# Patient Record
(demographics unavailable — no encounter records)

---

## 2024-11-18 NOTE — PHYSICAL EXAM
[FreeTextEntry3] : Vascular Exam: DP Pulse (left): 1/4. DP Pulse (right): 1/4. PT Pulse (left): 1/4. PT Pulse (right): 1/4. Capillary Return - L: Instantaneous. Capillary Return - R: Instantaneous. Temperature Grad - L: Hot to Warm. Temperature Grad - R: Hot to Warm. [de-identified] : Orthopedic Exam: No structural deformities present. [FreeTextEntry1] : Neurological Exam: Sharp / Dull - L: WNL. Sharp / Dull - R: WNL. Light Touch/pressure - L: WNL. Light Touch/pressure - R: WNL. Hot/cold - L: WNL. Hot/cold - R: WNL. Vibratory - L: WNL. Vibratory - R: WNL.

## 2024-11-18 NOTE — HISTORY OF PRESENT ILLNESS
[FreeTextEntry1] : Kj is a 61-year-old male who presents to our office this morning for continued care of itchy feet.  He used the cream we prescribed for a few weeks.  It went away but now seems to be returning.

## 2024-11-18 NOTE — PROCEDURE
[FreeTextEntry1] : Plan:  Examination.  (Op=[id number]).  We had a lengthy discussion concerning the patient's condition.  Lotion was applied and we suggested he apply OTC moisturizer daily. Patient to return: as needed.

## 2025-01-27 NOTE — HISTORY OF PRESENT ILLNESS
[FreeTextEntry1] : Kj is a 61-year-old male who presents complaining of itchy feet.  His skin is quite dry and doesn't seem to get better no matter what he uses.

## 2025-01-27 NOTE — PROCEDURE
[FreeTextEntry1] : Plan:  Examination.  (He=[id number]).  We had a lengthy discussion concerning the patient's condition.  Surgical debridement of the affected skin area.  Lotion was applied and we demonstrated how to properly apply OTC moisturizer.  He is to do it daily.  Patient to return: 2 months.

## 2025-01-27 NOTE — PHYSICAL EXAM
[FreeTextEntry3] : Vascular Exam: DP Pulse (left): 1/4. DP Pulse (right): 1/4. PT Pulse (left): 1/4. PT Pulse (right): 1/4. Capillary Return - L: Instantaneous. Capillary Return - R: Instantaneous. Temperature Grad - L: Hot to Warm. Temperature Grad - R: Hot to Warm. [de-identified] : Orthopedic Exam: No structural deformities present. [FreeTextEntry1] : Neurological Exam: Sharp / Dull - L: WNL. Sharp / Dull - R: WNL. Light Touch/pressure - L: WNL. Light Touch/pressure - R: WNL. Hot/cold - L: WNL. Hot/cold - R: WNL. Vibratory - L: WNL. Vibratory - R: WNL.

## 2025-04-11 NOTE — HISTORY OF PRESENT ILLNESS
[FreeTextEntry1] : Kj is a 61-year-old male who presents this morning for follow up care of dry, itchy feet.  He has been applying lotion when he remembers but his skin is still quite dry, and lotion doesn't seem to help.

## 2025-04-11 NOTE — PHYSICAL EXAM
[General Appearance - Alert] : alert [General Appearance - Well Nourished] : well nourished [General Appearance - Well-Appearing] : healthy appearing [FreeTextEntry3] : Vascular Exam: DP Pulse (left): 1/4. DP Pulse (right): 1/4. PT Pulse (left): 1/4. PT Pulse (right): 1/4. Capillary Return - L: Instantaneous. Capillary Return - R: Instantaneous. Temperature Grad - L: Hot to Warm. Temperature Grad - R: Hot to Warm. [de-identified] : Orthopedic Exam: No structural deformities present. [FreeTextEntry1] : Neurological Exam: Sharp / Dull - L: WNL. Sharp / Dull - R: WNL. Light Touch/pressure - L: WNL. Light Touch/pressure - R: WNL. Hot/cold - L: WNL. Hot/cold - R: WNL. Vibratory - L: WNL. Vibratory - R: WNL.

## 2025-04-11 NOTE — PROCEDURE
[FreeTextEntry1] : Plan:  Examination.  (Sb=[id number]).  We had a lengthy discussion concerning the patient's condition.  Surgical debridement of the affected skin area.  Lotion was applied and we demonstrated how to properly apply OTC moisturizer.  He promises to do it daily.  Patient to return: 2 months.

## 2025-06-23 NOTE — HISTORY OF PRESENT ILLNESS
[FreeTextEntry1] : Kj is a 62-year-old male who presents for continued care of dry on both feet.  He has been applying lotion when he remembers.  He had a piece of skin catch in the floor at home.

## 2025-06-23 NOTE — PROCEDURE
[FreeTextEntry1] : Plan:  Examination.  (Ss=[id number]).  We had a lengthy discussion concerning the patient's condition.  Surgical debridement of the affected skin area including the fissure site removing the sharp border.  Lotion was applied and we demonstrated how to properly apply OTC moisturizer.  He promises to do it daily.   Patient to return: 2 months.

## 2025-06-23 NOTE — PHYSICAL EXAM
[General Appearance - Alert] : alert [General Appearance - Well Nourished] : well nourished [General Appearance - Well-Appearing] : healthy appearing [FreeTextEntry3] : Vascular Exam: DP Pulse (left): 1/4. DP Pulse (right): 1/4. PT Pulse (left): 1/4. PT Pulse (right): 1/4. Capillary Return - L: Instantaneous. Capillary Return - R: Instantaneous. Temperature Grad - L: Hot to Warm. Temperature Grad - R: Hot to Warm. [de-identified] : Orthopedic Exam: No structural deformities present. [FreeTextEntry1] : Neurological Exam: Sharp / Dull - L: WNL. Sharp / Dull - R: WNL. Light Touch/pressure - L: WNL. Light Touch/pressure - R: WNL. Hot/cold - L: WNL. Hot/cold - R: WNL. Vibratory - L: WNL. Vibratory - R: WNL.